# Patient Record
(demographics unavailable — no encounter records)

---

## 2024-11-25 NOTE — CONSULT LETTER
[Dear  ___] : Dear  [unfilled], [Consult Letter:] : I had the pleasure of evaluating your patient, [unfilled]. [Please see my note below.] : Please see my note below. [Consult Closing:] : Thank you very much for allowing me to participate in the care of this patient.  If you have any questions, please do not hesitate to contact me. [Sincerely,] : Sincerely, [FreeTextEntry3] : Tee Antunez MD, PhD, DPN-N [facility] [place] Chief, Division of Neurology [facility]

## 2024-11-25 NOTE — HISTORY OF PRESENT ILLNESS
[FreeTextEntry1] : This 67-year-old man was seen in neurological consultation today accompanied by his wife He has had some weakness going on for the last 3 to 4 years that seems to be slowly progressive Seems to be weaker over the last year He has had 2 surgeries over the last year for gallbladder and prostate and has been relatively an active Says because of the weakness he has some difficulty walking and has been using a cane Has no trouble with chewing or swallowing There has been no change in speech or vision Denies any ptosis or double vision The weakness seems to be present throughout the day and not related to activity Some chronic neck and back pain that seems relatively mild No reported tremors Wife says he has also had some memory decline.  He presented to the emergency department at [facility] on 10/29/2024 following a fall where he tripped outside.  He was in CDU.  MRI of the brain reports moderate to severe small vessel ischemic changes.  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

## 2024-11-25 NOTE — PHYSICAL EXAM
[FreeTextEntry1] : There is no cervical palpation tenderness. There is full range of movement of the cervical spine  No tremors noted either at rest, with postural maneuvers or upon intention No cogwheeling or bradykinesia  Able to arise from a seated position without using his hands repetitively without difficulty [General Appearance - Alert] : alert [General Appearance - In No Acute Distress] : in no acute distress [General Appearance - Well-Appearing] : healthy appearing [Oriented To Time, Place, And Person] : oriented to person, place, and time [Impaired Insight] : insight and judgment were intact [Affect] : the affect was normal [Memory Recent] : recent memory was not impaired [Person] : oriented to person [Place] : oriented to place [Time] : oriented to time [Short Term Intact] : short term memory intact [Remote Intact] : remote memory intact [Registration Intact] : recent registration memory intact [Span Intact] : the attention span was normal [Concentration Intact] : normal concentrating ability [Visual Intact] : visual attention was ~T not ~L decreased [Fluency] : fluency intact [Comprehension] : comprehension intact [Past History] : adequate knowledge of personal past history [Cranial Nerves Optic (II)] : visual acuity intact bilaterally,  visual fields full to confrontation, pupils equal round and reactive to light [Cranial Nerves Oculomotor (III)] : extraocular motion intact [Cranial Nerves Trigeminal (V)] : facial sensation intact symmetrically [Cranial Nerves Facial (VII)] : face symmetrical [Cranial Nerves Vestibulocochlear (VIII)] : hearing was intact bilaterally [Cranial Nerves Glossopharyngeal (IX)] : tongue and palate midline [Cranial Nerves Accessory (XI - Cranial And Spinal)] : head turning and shoulder shrug symmetric [Motor Tone] : muscle tone was normal in all four extremities [Cranial Nerves Hypoglossal (XII)] : there was no tongue deviation with protrusion [Motor Strength] : muscle strength was normal in all four extremities [Involuntary Movements] : no involuntary movements were seen [No Muscle Atrophy] : normal bulk in all four extremities [Paresis Pronator Drift Right-Sided] : no pronator drift on the right [Paresis Pronator Drift Left-Sided] : no pronator drift on the left [Sensation Tactile Decrease] : light touch was intact [Sensation Pain / Temperature Decrease] : pain and temperature was intact [Proprioception] : proprioception was intact [Romberg's Sign] : Romberg's sign was negtive [Past-pointing] : there was no past-pointing [Tremor] : no tremor present [Coordination - Dysmetria Impaired Finger-to-Nose Bilateral] : not present [2+] : Patella left 2+ [1+] : Ankle jerk left 1+ [FreeTextEntry5] : No ptosis, no tongue fasciculations. [FreeTextEntry4] : Short-term recall 3/3 [FreeTextEntry8] : Gait slow, typically uses a cane but able to ambulate independently in the office with mild unsteadiness [PERRL With Normal Accommodation] : pupils were equal in size, round, reactive to light, with normal accommodation [Extraocular Movements] : extraocular movements were intact [Full Visual Field] : full visual field

## 2024-11-25 NOTE — ASSESSMENT
[FreeTextEntry1] : No clear-cut neurological deficit appreciated Complaining of progressive weakness but strength seems good to testing in the office I am recommending check of myasthenia and Lambert-Eaton serology as well as muscle enzymes He will return for EMG and nerve conduction studies I am also  suggesting a DaTscan to evaluate for possible parkinsonism even though there are no specific findings  on examination

## 2024-12-16 NOTE — CARDIOLOGY SUMMARY
[TextEntry] : ECG: EKG 23: NSR, nonspecific T wave changes EKG 23: NSR, non specific T wave changes EK/22, RSR, and specific polarization LAHB EKG 24: NSR, non specific T wave changes Remote/Ambulatory Rhythm Monitoring: Holter : HR , AV=81, 22 VPC, 14 APC Stress Test: Stress Test: , no Ischemia, 8mets, EF 50%. small, mild defect in inferior wall that is fixed and not significant with prone imaging. Echo: Echo:  LVEF 65% CT/MRI: Cardiac CT: 3/21, Ca++ Score 1816- 397 LM, 814 LAD, 299 Circ, 306 RCA Cardiac Cath/PCI: 2022: 40% left main, pLAD 80%, 40% LCx, RCA 40% s/p RAJAN to pLAD.  EKG 24: NSR, Non specific T wave EKG 24: NSR, T wave flattening I, aVL  TTE 2023: 1. Left ventricular systolic function is normal with an ejection fraction of 59 % by Aaron's method of disks. There are no regional wall motion abnormalities seen. 3. Right ventricular cavity is normal in size and normal systolic function. 6. Mild mitral regurgitation.  TTE 23: 1. Left ventricular wall thickness is normal. Left ventricular systolic function is normal with an ejection fraction of 57 % by Aaron's method of disks with an ejection fraction visually estimated at 60 to 65%. 2. Normal left ventricular diastolic function. 3. Estimated pulmonary artery systolic pressure is 29 mmHg, consistent with normal pulmonary artery pressure. 4. Mild mitral valve leaflet calcification. 5. There is mild calcification of the mitral valve annulus. 6. Trileaflet aortic valve. fibrocalcific aortic valve sclerosis without stenosis.  EKG 3/18/24: NSR EKG 24: NSR, Non specific T wave changes EKG 24:   NST 24: 1. Normal myocardial perfusion scan, with no evidence of infarction or inducible ischemia. 2. The patient underwent stress testing using pharmacological IV regadenoson (bolus injection, 400mcg over 10 to 20 seconds followed by 5ml flush) protocol. _ The total procedure time was 4 minutes . The test was stopped due to completion of protocol. 3. The left ventricle is normal in function. The post stress left ventricular EF is 56 %. The stress end diastolic volume is 82 ml and systolic volume is 36 ml. 4. Patient history: 65 yo male with c/o chest discomfort, history of CAD and prior PCI.

## 2025-07-02 NOTE — CONSULT LETTER
[Dear  ___] : Dear  [unfilled], [Courtesy Letter:] : I had the pleasure of seeing your patient, [unfilled], in my office today. [Please see my note below.] : Please see my note below. [Consult Closing:] : Thank you very much for allowing me to participate in the care of this patient.  If you have any questions, please do not hesitate to contact me. [Sincerely,] : Sincerely, [FreeTextEntry3] :  Charbel Bradley MD FACS

## 2025-07-02 NOTE — HISTORY OF PRESENT ILLNESS
[de-identified] : Patient with h/o chronic bilateral OE, cerumen impactions, and DM presents today stating that he has been having ear clogging. He states that he was recently in the hospital/rehab for a month because he has had trouble walking, which is being followed by his neurologist. He denies tinnitus. He denies issues with hearing.

## 2025-07-02 NOTE — PHYSICAL EXAM
[Hearing Milton Test (Tuning Fork On Forehead)] : no lateralization of tone [] : septum deviated bilaterally [Midline] : trachea located in midline position [Normal] : no rashes [Hearing Loss Right Only] : normal [Hearing Loss Left Only] : normal [FreeTextEntry7] : missing superior auricle [FreeTextEntry8] : cerumen impaction removed via curettage. Peroxide added and the remaining cerumen removed via suction [FreeTextEntry9] : cerumen impaction removed via curettage. Peroxide added and the remaining cerumen removed via suction [de-identified] : bilateral turbinate hypertrophy [de-identified] : class 1 [de-identified] : type 1 oral cavity [FreeTextEntry2] : sinuses nontender to percussion. sensations intact

## 2025-07-02 NOTE — HISTORY OF PRESENT ILLNESS
[de-identified] : Patient with h/o chronic bilateral OE, cerumen impactions, and DM presents today stating that he has been having ear clogging. He states that he was recently in the hospital/rehab for a month because he has had trouble walking, which is being followed by his neurologist. He denies tinnitus. He denies issues with hearing.

## 2025-07-02 NOTE — ASSESSMENT
[FreeTextEntry1] : Reviewed and reconciled medications, allergies, PMHx, PSHx, SocHx, FMHx.  Patient with h/o chronic bilateral OE, cerumen impactions, and DM presents today stating that he has been having ear clogging. He states that he was recently in the hospital/rehab for a month because he has had trouble walking, which is being followed by his neurologist. He denies tinnitus. He denies issues with hearing.  Physical exam: -right ear canal: cerumen impaction removed via curettage. Peroxide added and the remaining cerumen removed via suction -left ear canal: cerumen impaction removed via curettage. Peroxide added and the remaining cerumen removed via suction -no lateralization to tuning forks -deviated septum bilaterally L>R -bilateral turbinate hypertrophy -class 1 tonsils -type 1 oral cavity  Plan: -FU in 1 year

## 2025-07-02 NOTE — PHYSICAL EXAM
[Hearing Milton Test (Tuning Fork On Forehead)] : no lateralization of tone [] : septum deviated bilaterally [Midline] : trachea located in midline position [Normal] : no rashes [Hearing Loss Right Only] : normal [Hearing Loss Left Only] : normal [FreeTextEntry7] : missing superior auricle [FreeTextEntry8] : cerumen impaction removed via curettage. Peroxide added and the remaining cerumen removed via suction [FreeTextEntry9] : cerumen impaction removed via curettage. Peroxide added and the remaining cerumen removed via suction [de-identified] : bilateral turbinate hypertrophy [de-identified] : class 1 [de-identified] : type 1 oral cavity [FreeTextEntry2] : sinuses nontender to percussion. sensations intact

## 2025-07-02 NOTE — ADDENDUM
[FreeTextEntry1] :  Documented by Sajan Diaz acting as scribe for Dr. Bradley on 07/02/2025. All Medical record entries made by the Scribe were at my, Dr. Bradley, direction and personally dictated by me on 07/02/2025 . I have reviewed the chart and agree that the record accurately reflects my personal performance of the history, physical exam, assessment and plan. I have also personally directed, reviewed, and agreed with the discharge instructions.